# Patient Record
Sex: FEMALE | Race: WHITE
[De-identification: names, ages, dates, MRNs, and addresses within clinical notes are randomized per-mention and may not be internally consistent; named-entity substitution may affect disease eponyms.]

---

## 2020-08-09 ENCOUNTER — HOSPITAL ENCOUNTER (EMERGENCY)
Dept: HOSPITAL 62 - ER | Age: 45
Discharge: HOME | End: 2020-08-09
Payer: COMMERCIAL

## 2020-08-09 VITALS — DIASTOLIC BLOOD PRESSURE: 83 MMHG | SYSTOLIC BLOOD PRESSURE: 128 MMHG

## 2020-08-09 DIAGNOSIS — Z76.0: Primary | ICD-10-CM

## 2020-08-09 PROCEDURE — 99281 EMR DPT VST MAYX REQ PHY/QHP: CPT

## 2020-08-09 NOTE — ER DOCUMENT REPORT
HPI





- HPI


Patient complains to provider of: Requesting Suboxone


Time Seen by Provider: 20 15:42


Onset: Other


Onset/Duration: Worse


Quality of pain: Throbbing


Pain Level: 5


Context: 





45-year-old female presented to ED stating that her doctor is not giving her 

enough Suboxone and she needs more today.  I informed her that we are not 

allowed to give Suboxone or prescribe Suboxone that you need a special license 

for this.  She states can you give her some Percocet.  I told her no it actually

has the opposite effect and I cannot prescribe or give that while she is on 

Suboxone


Associated Symptoms: Other - Moaning states she needs Suboxone


Exacerbated by: Movement


Relieved by: Denies


Similar symptoms previously: Yes


Recently seen / treated by doctor: Yes





- ROS


ROS below otherwise negative: Yes





- CONSTITUTIONAL


Constitutional: DENIES: Fever, Chills





- EENT


EENT: DENIES: Sore Throat, Ear Pain, Nasal Drainage-Clear, Nasal Drainage-

Purulent, Congestion, Eye problems





- NEURO


Neurology: DENIES: Headache, Weakness, Vision blurred, Dizzinesss / Vertigo





- CARDIOVASCULAR


Cardiovascular: DENIES: Chest pain





- RESPIRATORY


Respiratory: DENIES: Trouble Breathing, Coughing





- GASTROINTESTINAL


Gastrointestinal: REPORTS: Abdominal Pain.  DENIES: Nausea, Patient vomiting, 

Diarrhea, Constipation, Black / Bloody Stools





- URINARY


Urinary: DENIES: Dysuria, Urgency, Frequency





- REPRODUCTIVE


Reproductive: DENIES: Pregnant:, Postmenopausal, Abnormal bleeding / discharge





- MUSCULOSKELETAL


Musculoskeletal: DENIES: Extremity pain, Back Pain, Neck Pain, Swelling





- DERM


Skin Color: Normal


Skin Problems: None





Past Medical History





- General


Information source: Patient





- Social History


Smoking Status: Never Smoker


Chew tobacco use (# tins/day): No


Frequency of alcohol use: None


Drug Abuse: None


Lives with: Family


Family History: Reviewed & Not Pertinent


Patient has homicidal ideation: No





- Past Medical History


Cardiac Medical History: Reports: None


Pulmonary Medical History: Reports: None


EENT Medical History: Reports: None


Neurological Medical History: Reports: None, Hx Migraine


Endocrine Medical History: Reports: None


Renal/ Medical History: Reports: None


Malignancy Medical History: Reports: None


GI Medical History: Reports: Other - Chronic pain abdomen


Musculoskeletal Medical History: Reports None


Skin Medical History: Reports None


Psychiatric Medical History: Reports: None


Traumatic Medical History: Reports: None


Infectious Medical History: Reports: None


Past Surgical History: Reports: Hx Abdominal Surgery, Hx  Section - X 2,

Hx Hysterectomy





- Immunizations


Hx Diphtheria, Pertussis, Tetanus Vaccination: Yes





Vertical Provider Document





- CONSTITUTIONAL


Agree With Documented VS: Yes


Exam Limitations: No Limitations


General Appearance: Mild Distress





- INFECTION CONTROL


TRAVEL OUTSIDE OF THE [place] IN LAST 30 DAYS: No





- NECK


Neck: Normal Inspection, Supple





- RESPIRATORY


Respiratory: Breath Sounds Normal, No Respiratory Distress





- CARDIOVASCULAR


Cardiovascular: Regular Rate, Regular Rhythm





- GI/ABDOMEN


Gastrointestinal: Abdomen Soft, Abdomen Tender - States her abdomen is always 

painful, No Organomegaly, Normal Bowel Sounds





- BACK


Back: Normal Inspection





- NEURO


Level of Consciousness: Awake, Alert, Appropriate





- DERM


Integumentary: Warm, Dry, No Rash





Course





- Re-evaluation


Re-evalutation: 





20 23:29


Patient very angry because she could not get Suboxone in the emergency room.  I 

explained to her that you had to have a special license to write Suboxone and 

only people that are licensed to prescribe Suboxone can write Suboxone.  She 

stated could we give her some Percocet and explained to her that that is the 

opposite effect to the Suboxone she needed to follow-up with whoever is treating

her chronic pain and not get prescriptions from other people when you are on 

Suboxone.





- Vital Signs


Vital signs: 


                                        











Temp Pulse Resp BP Pulse Ox


 


 98.4 F   115 H  20   128/83 H  98 


 


 20 14:45  20 14:45  20 14:45  20 14:45  20 14:45














Discharge





- Discharge


Clinical Impression: 


 Requesting Suboxone





Condition: Stable


Disposition: HOME, SELF-CARE


Additional Instructions: 


As I have explained to you you have to have a special license to give or 

prescribe Suboxone








I cannot give or prescribe Suboxone








You can speak to your primary provider who is been given you your Suboxone and I

will give you the name and number of [facility].














FOLLOW-UP CARE:


If you have been referred to a physician for follow-up care, call the 

physicians office for an appointment as you were instructed or within the next 

two days.  If you experience worsening or a significant change in your symptoms,

notify the physician immediately or return to the Emergency Department at any 

time for re-evaluation.


Forms:  Elevated Blood Pressure


Referrals: 


[facility],NO [NO LOCAL MD] - Follow up as needed


MELVA BATISTA MD [NO LOCAL MD] - Follow up as needed


[facility] [Provider Group] - Follow up as needed